# Patient Record
Sex: MALE | Race: WHITE | Employment: OTHER | URBAN - METROPOLITAN AREA
[De-identification: names, ages, dates, MRNs, and addresses within clinical notes are randomized per-mention and may not be internally consistent; named-entity substitution may affect disease eponyms.]

---

## 2023-06-06 ENCOUNTER — TELEPHONE (OUTPATIENT)
Dept: CARDIOLOGY CLINIC | Age: 73
End: 2023-06-06

## 2023-06-06 NOTE — TELEPHONE ENCOUNTER
Left message for patient requesting a return call to schedule a elizabeth for dyspnea per referral from Dr. Juany Cadet.

## 2023-06-09 ENCOUNTER — TELEPHONE (OUTPATIENT)
Dept: CARDIOLOGY CLINIC | Age: 73
End: 2023-06-09

## 2023-06-09 NOTE — TELEPHONE ENCOUNTER
Attempting to schedule a elizabeth for dyspnea per referral from Dr. Aileen Easton; phone line is disconnected.

## 2023-06-14 ENCOUNTER — TELEPHONE (OUTPATIENT)
Dept: CARDIOLOGY CLINIC | Age: 73
End: 2023-06-14

## 2023-06-14 NOTE — TELEPHONE ENCOUNTER
Attempting to schedule a elizabeth for dyspnea per referral from Dr. Kasandra Goodell; phone line is disconnected.

## 2023-07-07 DIAGNOSIS — R06.02 SOB (SHORTNESS OF BREATH): Primary | ICD-10-CM

## 2023-07-07 NOTE — PROGRESS NOTES
Order placed in [address] [place] [address] 15 for [email or web address]. stress test per [facility] Referral from Timmy Contreras.

## 2023-07-11 ENCOUNTER — PROCEDURE VISIT (OUTPATIENT)
Dept: CARDIOLOGY CLINIC | Age: 73
End: 2023-07-11

## 2023-07-11 DIAGNOSIS — R06.02 SOB (SHORTNESS OF BREATH): ICD-10-CM

## 2023-07-11 LAB
LV EF: 58 %
LVEF MODALITY: NORMAL

## 2023-08-14 ENCOUNTER — TELEPHONE (OUTPATIENT)
Dept: CARDIOLOGY CLINIC | Age: 73
End: 2023-08-14